# Patient Record
Sex: FEMALE | URBAN - METROPOLITAN AREA
[De-identification: names, ages, dates, MRNs, and addresses within clinical notes are randomized per-mention and may not be internally consistent; named-entity substitution may affect disease eponyms.]

---

## 2019-06-15 ENCOUNTER — NURSE TRIAGE (OUTPATIENT)
Dept: FAMILY MEDICINE | Facility: CLINIC | Age: 84
End: 2019-06-15

## 2019-06-15 NOTE — TELEPHONE ENCOUNTER
Kinsey is calling from [facility] , she is feeling and looking like a cadaver. Her skin is hanging off her bones in wrinkles.She is gaining weight and exercising and doing well. Wanted to see the DrNina  And then just wanted to vent and talk about what she was feeling. I tried to tell her if the staff where she was living is not concerned she is OK. She denied needing any more help at this time and was done tying up the phone line.    Britt Cleary RN/ [facility]      Reason for Disposition    Health Information question, no triage required and triager able to answer question    Protocols used: INFORMATION ONLY CALL-A-AH